# Patient Record
Sex: MALE | Race: WHITE | NOT HISPANIC OR LATINO | URBAN - METROPOLITAN AREA
[De-identification: names, ages, dates, MRNs, and addresses within clinical notes are randomized per-mention and may not be internally consistent; named-entity substitution may affect disease eponyms.]

---

## 2024-11-29 PROBLEM — R00.2 PALPITATIONS: Status: ACTIVE | Noted: 2024-11-29

## 2024-11-29 PROBLEM — E78.5 HYPERLIPIDEMIA: Status: ACTIVE | Noted: 2022-09-19

## 2024-12-02 ENCOUNTER — OFFICE VISIT (OUTPATIENT)
Dept: PODIATRY | Facility: HOSPITAL | Age: 49
End: 2024-12-02
Payer: COMMERCIAL

## 2024-12-02 VITALS
BODY MASS INDEX: 28.63 KG/M2 | HEIGHT: 72 IN | OXYGEN SATURATION: 97 % | WEIGHT: 211.4 LBS | HEART RATE: 63 BPM | DIASTOLIC BLOOD PRESSURE: 67 MMHG | SYSTOLIC BLOOD PRESSURE: 110 MMHG

## 2024-12-02 DIAGNOSIS — M20.22 HALLUX RIGIDUS OF BOTH FEET: Primary | ICD-10-CM

## 2024-12-02 DIAGNOSIS — M20.21 HALLUX RIGIDUS OF BOTH FEET: Primary | ICD-10-CM

## 2024-12-02 DIAGNOSIS — M79.672 PAIN IN BOTH FEET: ICD-10-CM

## 2024-12-02 DIAGNOSIS — L84 CORN OF FOOT: ICD-10-CM

## 2024-12-02 DIAGNOSIS — M79.671 PAIN IN BOTH FEET: ICD-10-CM

## 2024-12-02 PROCEDURE — 99406 BEHAV CHNG SMOKING 3-10 MIN: CPT | Performed by: STUDENT IN AN ORGANIZED HEALTH CARE EDUCATION/TRAINING PROGRAM

## 2024-12-02 PROCEDURE — 99214 OFFICE O/P EST MOD 30 MIN: CPT | Mod: 25 | Performed by: STUDENT IN AN ORGANIZED HEALTH CARE EDUCATION/TRAINING PROGRAM

## 2024-12-02 RX ORDER — MELOXICAM 15 MG/1
15 TABLET ORAL DAILY
Qty: 30 TABLET | Refills: 1 | Status: SHIPPED | OUTPATIENT
Start: 2024-12-02 | End: 2025-01-01

## 2024-12-02 ASSESSMENT — PAIN SCALES - GENERAL: PAINLEVEL_OUTOF10: 6

## 2024-12-02 NOTE — PROGRESS NOTES
Initial Podiatric Office Visit:    HPI:  This 49 y.o. male with PMH indicated below presents today for pain in both of his big toe joints. Patient states that he has had pain in these joints on and off for about 30 years and that he has always been very active. He went on vacation in July of this year, and since then, the pain is significant. He works as a  so he is on his feet a lot. He had his PCP do x-rays at [facility] and he was referred here. He also has a corn on his right foot. He bought Hokas and they have helped his big toe joint pain. Does notice they hurt more in Hey Dude shoes or other shoes that have minimal support. Patient denies other constitutional symptoms and other pedal complaints today.    PCP:  Onofre Pennington MD:    PMH  No past medical history on file.:    MEDICATIONS  No current outpatient medications on file.     No current facility-administered medications for this visit.   :  Not on File:  No past surgical history on file.  No family history on file.:  Social History     Socioeconomic History    Marital status:      Social Drivers of Health     Financial Resource Strain: Low Risk  (10/27/2024)    Received from [facility]    Overall Financial Resource Strain (CARDIA)     Difficulty of Paying Living Expenses: Not very hard   Food Insecurity: No Food Insecurity (10/27/2024)    Received from [facility]    Hunger Vital Sign     Worried About Running Out of Food in the Last Year: Never true     Ran Out of Food in the Last Year: Never true   Transportation Needs: No Transportation Needs (10/27/2024)    Received from [facility]    PRAPARE - Transportation     Lack of Transportation (Medical): No     Lack of Transportation (Non-Medical): No   Physical Activity: Insufficiently Active (10/27/2024)    Received from [facility]    Exercise Vital Sign     Days of Exercise per Week: 2 days     Minutes of Exercise per Session: 20 min   Stress: No Stress Concern  Present (10/27/2024)    Received from [facility]    [employer] [place] [employer] - Occupational Stress Questionnaire     Feeling of Stress : Not at all   Social Connections: Moderately Isolated (10/27/2024)    Received from [facility]    Social Connection and Isolation Panel [NHANES]     Frequency of Communication with Friends and Family: Once a week     Frequency of Social Gatherings with Friends and Family: Never     Attends [employer] Services: 1 to 4 times per year     Active Member of Clubs or Organizations: No     Attends Club or Organization Meetings: Never     Marital Status:    Housing Stability: Unknown (10/27/2024)    Received from [facility]    Housing Stability Vital Sign     Unable to Pay for Housing in the Last Year: No       Review of Systems:    GENERAL: Negative for fatigue, weight loss, malaise, or fevers.  HEENT: Negative for frequent or significant headaches.   RESPIRATORY: Negative for cough, wheezing or shortness of breath.  CARDIOVASCULAR: Negative for chest pain or palpitations.  GI: Negative for abdominal discomfort, nausea, vomiting.   MUSCULOSKELETAL: Negative for back pain or joint pain.   SKIN: Negative for lesions, rash, and itching.  NEURO: Negative for dizziness, weakness.     Physical Exam:   General: AAOx3, no acute distress    Vasc: Dorsalis pedis and posterior tibial pulses are palpable bilateral.  Capillary refill time is less than 3 seconds to the hallux bilateral. Skin temperature is warm to cool from proximal tibia to distal digits bilateral. No erythema noted. No edema noted.      Neuro: Light touch sensation is intact to the foot bilateral.  Protective sensation is intact to the foot when tested with the 5.07 SWM bilateral.      Derm: Skin is supple with normal texture and turgor noted. Nails 1-5 bilateral are dystrophic. Webspaces 1-4 are clean, dry and intact bilateral. There is a corn on the lateral left foot fifth MTPJ.    Ortho:  Muscle strength is +5/5 for all four pedal muscle groups bilateral. Ankle joint, subtalar joint is full and without pain or crepitus bilateral. 1st MPJ ROM is decreased with pain and crepitus bilateral, motion is more limited on left. Bilateral mild hallx valgus deformity and adductovarus rotation of fifth digit.     Radiographs: Reviewed bilateral foot x-rays performed at [facility] on 10/30/24. 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     Assessment and Plan:     Problem List Items Addressed This Visit    None  Visit Diagnoses         Codes    Hallux rigidus of both feet    -  Primary M20.21, M20.22    Pain in both feet     M79.671, M79.672    Corn of foot     L84            - Patient was examined and findings were discussed with patient  - Chart, labs, imaging reviewed.   - Patient has bilateral painful hallux rigidus and painful left corn.   - Discussed etiologies and treatment options, including conservative and surgical care, and recommended starting with conservative care.   - Verbal consent was obtained and corn on left foot was pared using ring curette without incident. Patient expressed immediate relief.   - Recommended carbon fiber insert for both shoes for hallux rigidus, and gave patient the [employer] link for a pair. Instructed patient on usage.  - Rx for oral meloxicam 15mg and instructed patient to take it for 3 weeks.  - Discussed that in future, may consider oral steroid, steroid injections, and surgical fusion of the joints.   - Recommended patient file corn area with pumice stone or Six3 board.   - RTC in 4 weeks.     Al Dunaway DPM PGY-3    I saw and evaluated the patient. I personally obtained the key and critical portions of the history and physical exam or was physically present for key and critical portions performed by the resident/fellow. I reviewed the  resident/fellow's documentation and discussed the patient with the resident/fellow. I agree with the resident/fellow's medical decision making as documented in the note.    I spent 45 minutes in the professional and overall care of this patient.    Tori Abad DPM